# Patient Record
Sex: MALE | Race: BLACK OR AFRICAN AMERICAN | NOT HISPANIC OR LATINO | URBAN - METROPOLITAN AREA
[De-identification: names, ages, dates, MRNs, and addresses within clinical notes are randomized per-mention and may not be internally consistent; named-entity substitution may affect disease eponyms.]

---

## 2020-03-09 ENCOUNTER — EMERGENCY (EMERGENCY)
Facility: HOSPITAL | Age: 25
LOS: 1 days | End: 2020-03-09
Admitting: EMERGENCY MEDICINE
Payer: OTHER GOVERNMENT

## 2020-03-09 PROCEDURE — 99284 EMERGENCY DEPT VISIT MOD MDM: CPT | Mod: 57

## 2020-03-09 PROCEDURE — 73110 X-RAY EXAM OF WRIST: CPT | Mod: 26,RT

## 2020-03-09 PROCEDURE — 73130 X-RAY EXAM OF HAND: CPT | Mod: 26,RT

## 2020-03-09 PROCEDURE — 26600 TREAT METACARPAL FRACTURE: CPT | Mod: 54

## 2020-03-10 PROBLEM — Z00.00 ENCOUNTER FOR PREVENTIVE HEALTH EXAMINATION: Status: ACTIVE | Noted: 2020-03-10

## 2020-03-12 ENCOUNTER — APPOINTMENT (OUTPATIENT)
Dept: ORTHOPEDIC SURGERY | Facility: CLINIC | Age: 25
End: 2020-03-12
Payer: SELF-PAY

## 2020-03-12 VITALS — BODY MASS INDEX: 19.48 KG/M2 | HEIGHT: 76 IN | WEIGHT: 160 LBS

## 2020-03-12 DIAGNOSIS — F12.90 CANNABIS USE, UNSPECIFIED, UNCOMPLICATED: ICD-10-CM

## 2020-03-12 DIAGNOSIS — Z78.9 OTHER SPECIFIED HEALTH STATUS: ICD-10-CM

## 2020-03-12 DIAGNOSIS — S62.306A UNSPECIFIED FRACTURE OF FIFTH METACARPAL BONE, RIGHT HAND, INITIAL ENCOUNTER FOR CLOSED FRACTURE: ICD-10-CM

## 2020-03-12 PROCEDURE — 73130 X-RAY EXAM OF HAND: CPT | Mod: RT

## 2020-03-12 PROCEDURE — 29125 APPL SHORT ARM SPLINT STATIC: CPT | Mod: RT

## 2020-03-12 PROCEDURE — 99203 OFFICE O/P NEW LOW 30 MIN: CPT | Mod: 25

## 2020-03-12 NOTE — PROCEDURE
[FreeTextEntry1] : He was placed into a well-padded and well-molded right short arm ulnar gutter splint in the intrinsic plus position. He was instructed on cast care and activity modification. He will follow-up in 3 weeks for x-rays out of the splint.

## 2020-03-12 NOTE — DISCUSSION/SUMMARY
[FreeTextEntry1] : He has findings consistent with an acute on chronic right fifth metacarpal shaft fracture.  The overall alignment is acceptable.\par \par I had a discussion regarding today's visit, the diagnosis, and treatment recommendations / options. At this time, I recommended placement into a splint. He will follow-up in 3 weeks for x-rays out of the splint.\par \par The patient has agreed to this plan of management and has expressed full understanding.  All questions were fully answered to the patient's satisfaction.\par \par Over 50% of the time spent with the patient was on counseling the patient on the above diagnosis, treatment plan and prognosis.\par

## 2020-03-12 NOTE — PHYSICAL EXAM
[de-identified] : - Constitutional: This is a male in no obvious distress. He is a prisoner and was escorted in by two police officers.\par - Psych: Patient is alert and oriented to person, place and time.  Patient has a normal mood and affect.\par - Cardiovascular: Normal pulses throughout the upper extremities.  No significant varicosities are noted in the upper extremities. \par - Neuro: Strength and sensation are intact throughout the upper extremities.  Patient has normal coordination.\par - Respiratory:  Patient exhibits no evidence of shortness of breath or difficulty breathing.\par - Skin: No rashes, lesions, or other abnormalities are noted in the upper extremities.\par \par ---\par \par Examination of his right hand after the splint was removed demonstrates mild swelling.  He is tender along the proximal shaft of the fifth metacarpal.  There is a palpable dorsal prominence.  There is no rotational deformity.  He has appropriate flexion and extension of the digits.  He is neurovascularly intact distally. [de-identified] : I reviewed AP, lateral oblique radiographs of his right hand from 4 days ago which demonstrate a fourth metacarpal shaft fracture with callus.  There is minimal dorsal angulation apex dorsal of approximately 10 degrees.

## 2020-03-12 NOTE — HISTORY OF PRESENT ILLNESS
[Right] : right hand dominant [FreeTextEntry1] : He comes in today for evaluation of a right hand facture, which occurred 3 months ago. He reports that he has fractured his right hand after punching a wall. He states that he refractured the wrist "a couple times" with the latest fracture having occurred 4 days ago.  [place] [facility] [place] where he had x-rays taken and he was placed into a splint. He rates his pain a 9 out of 10 at this time. \par \par He is a prisoner and was escorted in with two police officers.

## 2020-03-12 NOTE — END OF VISIT
[FreeTextEntry3] : All medical record entries made by the Scribe were at my, Dr. Correa, direction and personally dictated by me on 03/12/2020. I have reviewed the chart and agree that the record accurately reflects my personal performances of the history, physical exam, assessment, and plan. I have also personally directed, reviewed, and agreed with the chart.\par

## 2021-02-19 ENCOUNTER — INPATIENT (INPATIENT)
Facility: HOSPITAL | Age: 26
LOS: 1 days | Discharge: HOME | DRG: 310 | End: 2021-02-21
Attending: INTERNAL MEDICINE | Admitting: INTERNAL MEDICINE
Payer: COMMERCIAL

## 2021-02-19 VITALS
SYSTOLIC BLOOD PRESSURE: 99 MMHG | TEMPERATURE: 98 F | HEART RATE: 98 BPM | OXYGEN SATURATION: 99 % | DIASTOLIC BLOOD PRESSURE: 61 MMHG | RESPIRATION RATE: 16 BRPM

## 2021-02-19 DIAGNOSIS — I48.91 UNSPECIFIED ATRIAL FIBRILLATION: ICD-10-CM

## 2021-02-19 LAB
ACETONE SERPL-MCNC: NEGATIVE — SIGNIFICANT CHANGE UP
ALBUMIN SERPL ELPH-MCNC: 4.2 G/DL — SIGNIFICANT CHANGE UP (ref 3.3–5.2)
ALBUMIN SERPL ELPH-MCNC: 4.4 G/DL — SIGNIFICANT CHANGE UP (ref 3.3–5.2)
ALP SERPL-CCNC: 61 U/L — SIGNIFICANT CHANGE UP (ref 40–120)
ALP SERPL-CCNC: 68 U/L — SIGNIFICANT CHANGE UP (ref 40–120)
ALT FLD-CCNC: 16 U/L — SIGNIFICANT CHANGE UP
ALT FLD-CCNC: 18 U/L — SIGNIFICANT CHANGE UP
ANION GAP SERPL CALC-SCNC: 11 MMOL/L — SIGNIFICANT CHANGE UP (ref 5–17)
ANION GAP SERPL CALC-SCNC: 24 MMOL/L — HIGH (ref 5–17)
APPEARANCE UR: CLEAR — SIGNIFICANT CHANGE UP
AST SERPL-CCNC: 23 U/L — SIGNIFICANT CHANGE UP
AST SERPL-CCNC: 25 U/L — SIGNIFICANT CHANGE UP
BASE EXCESS BLDV CALC-SCNC: -1.3 MMOL/L — SIGNIFICANT CHANGE UP (ref -2–2)
BASE EXCESS BLDV CALC-SCNC: -11.2 MMOL/L — LOW (ref -2–2)
BASOPHILS # BLD AUTO: 0.05 K/UL — SIGNIFICANT CHANGE UP (ref 0–0.2)
BASOPHILS NFR BLD AUTO: 0.2 % — SIGNIFICANT CHANGE UP (ref 0–2)
BILIRUB SERPL-MCNC: 0.2 MG/DL — LOW (ref 0.4–2)
BILIRUB SERPL-MCNC: <0.2 MG/DL — LOW (ref 0.4–2)
BILIRUB UR-MCNC: NEGATIVE — SIGNIFICANT CHANGE UP
BUN SERPL-MCNC: 5 MG/DL — LOW (ref 8–20)
BUN SERPL-MCNC: 5 MG/DL — LOW (ref 8–20)
CA-I SERPL-SCNC: 1.1 MMOL/L — LOW (ref 1.15–1.33)
CA-I SERPL-SCNC: 1.15 MMOL/L — SIGNIFICANT CHANGE UP (ref 1.15–1.33)
CALCIUM SERPL-MCNC: 8.3 MG/DL — LOW (ref 8.6–10.2)
CALCIUM SERPL-MCNC: 8.9 MG/DL — SIGNIFICANT CHANGE UP (ref 8.6–10.2)
CHLORIDE BLDV-SCNC: 105 MMOL/L — SIGNIFICANT CHANGE UP (ref 98–107)
CHLORIDE BLDV-SCNC: 108 MMOL/L — HIGH (ref 98–107)
CHLORIDE SERPL-SCNC: 101 MMOL/L — SIGNIFICANT CHANGE UP (ref 98–107)
CHLORIDE SERPL-SCNC: 105 MMOL/L — SIGNIFICANT CHANGE UP (ref 98–107)
CO2 SERPL-SCNC: 14 MMOL/L — LOW (ref 22–29)
CO2 SERPL-SCNC: 23 MMOL/L — SIGNIFICANT CHANGE UP (ref 22–29)
COLOR SPEC: YELLOW — SIGNIFICANT CHANGE UP
CREAT SERPL-MCNC: 1.1 MG/DL — SIGNIFICANT CHANGE UP (ref 0.5–1.3)
CREAT SERPL-MCNC: 1.41 MG/DL — HIGH (ref 0.5–1.3)
DIFF PNL FLD: NEGATIVE — SIGNIFICANT CHANGE UP
EOSINOPHIL # BLD AUTO: 0.02 K/UL — SIGNIFICANT CHANGE UP (ref 0–0.5)
EOSINOPHIL NFR BLD AUTO: 0.1 % — SIGNIFICANT CHANGE UP (ref 0–6)
GAS PNL BLDV: 140 MMOL/L — SIGNIFICANT CHANGE UP (ref 135–145)
GAS PNL BLDV: 141 MMOL/L — SIGNIFICANT CHANGE UP (ref 135–145)
GAS PNL BLDV: SIGNIFICANT CHANGE UP
GLUCOSE BLDV-MCNC: 182 MG/DL — HIGH (ref 70–99)
GLUCOSE BLDV-MCNC: 82 MG/DL — SIGNIFICANT CHANGE UP (ref 70–99)
GLUCOSE SERPL-MCNC: 215 MG/DL — HIGH (ref 70–99)
GLUCOSE SERPL-MCNC: 84 MG/DL — SIGNIFICANT CHANGE UP (ref 70–99)
GLUCOSE UR QL: NEGATIVE MG/DL — SIGNIFICANT CHANGE UP
HCO3 BLDV-SCNC: 15 MMOL/L — LOW (ref 20–26)
HCO3 BLDV-SCNC: 23 MMOL/L — SIGNIFICANT CHANGE UP (ref 20–26)
HCT VFR BLD CALC: 44.5 % — SIGNIFICANT CHANGE UP (ref 39–50)
HCT VFR BLDA CALC: 42 — SIGNIFICANT CHANGE UP (ref 39–50)
HCT VFR BLDA CALC: 47 — SIGNIFICANT CHANGE UP (ref 39–50)
HGB BLD CALC-MCNC: 13.7 G/DL — SIGNIFICANT CHANGE UP (ref 13–17)
HGB BLD CALC-MCNC: 15.4 G/DL — SIGNIFICANT CHANGE UP (ref 13–17)
HGB BLD-MCNC: 14.7 G/DL — SIGNIFICANT CHANGE UP (ref 13–17)
IMM GRANULOCYTES NFR BLD AUTO: 1 % — SIGNIFICANT CHANGE UP (ref 0–1.5)
KETONES UR-MCNC: NEGATIVE — SIGNIFICANT CHANGE UP
LACTATE BLDV-MCNC: 12.8 MMOL/L — CRITICAL HIGH (ref 0.5–2)
LACTATE BLDV-MCNC: 2 MMOL/L — SIGNIFICANT CHANGE UP (ref 0.5–2)
LEUKOCYTE ESTERASE UR-ACNC: NEGATIVE — SIGNIFICANT CHANGE UP
LYMPHOCYTES # BLD AUTO: 12.1 % — LOW (ref 13–44)
LYMPHOCYTES # BLD AUTO: 2.43 K/UL — SIGNIFICANT CHANGE UP (ref 1–3.3)
MCHC RBC-ENTMCNC: 30.6 PG — SIGNIFICANT CHANGE UP (ref 27–34)
MCHC RBC-ENTMCNC: 33 GM/DL — SIGNIFICANT CHANGE UP (ref 32–36)
MCV RBC AUTO: 92.5 FL — SIGNIFICANT CHANGE UP (ref 80–100)
MONOCYTES # BLD AUTO: 0.93 K/UL — HIGH (ref 0–0.9)
MONOCYTES NFR BLD AUTO: 4.6 % — SIGNIFICANT CHANGE UP (ref 2–14)
NEUTROPHILS # BLD AUTO: 16.48 K/UL — HIGH (ref 1.8–7.4)
NEUTROPHILS NFR BLD AUTO: 82 % — HIGH (ref 43–77)
NITRITE UR-MCNC: NEGATIVE — SIGNIFICANT CHANGE UP
OTHER CELLS CSF MANUAL: 15 ML/DL — LOW (ref 18–22)
OTHER CELLS CSF MANUAL: 18 ML/DL — SIGNIFICANT CHANGE UP (ref 18–22)
PCO2 BLDV: 46 MMHG — SIGNIFICANT CHANGE UP (ref 35–50)
PCO2 BLDV: 48 MMHG — SIGNIFICANT CHANGE UP (ref 35–50)
PH BLDV: 7.17 — CRITICAL LOW (ref 7.32–7.43)
PH BLDV: 7.34 — SIGNIFICANT CHANGE UP (ref 7.32–7.43)
PH UR: 6 — SIGNIFICANT CHANGE UP (ref 5–8)
PLATELET # BLD AUTO: 215 K/UL — SIGNIFICANT CHANGE UP (ref 150–400)
PO2 BLDV: 133 MMHG — HIGH (ref 25–45)
PO2 BLDV: 48 MMHG — HIGH (ref 25–45)
POTASSIUM BLDV-SCNC: 3.7 MMOL/L — SIGNIFICANT CHANGE UP (ref 3.4–4.5)
POTASSIUM BLDV-SCNC: 4 MMOL/L — SIGNIFICANT CHANGE UP (ref 3.4–4.5)
POTASSIUM SERPL-MCNC: 4 MMOL/L — SIGNIFICANT CHANGE UP (ref 3.5–5.3)
POTASSIUM SERPL-MCNC: 4 MMOL/L — SIGNIFICANT CHANGE UP (ref 3.5–5.3)
POTASSIUM SERPL-SCNC: 4 MMOL/L — SIGNIFICANT CHANGE UP (ref 3.5–5.3)
POTASSIUM SERPL-SCNC: 4 MMOL/L — SIGNIFICANT CHANGE UP (ref 3.5–5.3)
PROT SERPL-MCNC: 6.7 G/DL — SIGNIFICANT CHANGE UP (ref 6.6–8.7)
PROT SERPL-MCNC: 7.2 G/DL — SIGNIFICANT CHANGE UP (ref 6.6–8.7)
PROT UR-MCNC: NEGATIVE MG/DL — SIGNIFICANT CHANGE UP
RBC # BLD: 4.81 M/UL — SIGNIFICANT CHANGE UP (ref 4.2–5.8)
RBC # FLD: 12.1 % — SIGNIFICANT CHANGE UP (ref 10.3–14.5)
SAO2 % BLDV: 73 % — SIGNIFICANT CHANGE UP
SAO2 % BLDV: 99 % — SIGNIFICANT CHANGE UP
SARS-COV-2 RNA SPEC QL NAA+PROBE: SIGNIFICANT CHANGE UP
SODIUM SERPL-SCNC: 139 MMOL/L — SIGNIFICANT CHANGE UP (ref 135–145)
SODIUM SERPL-SCNC: 139 MMOL/L — SIGNIFICANT CHANGE UP (ref 135–145)
SP GR SPEC: 1 — LOW (ref 1.01–1.02)
TROPONIN T SERPL-MCNC: <0.01 NG/ML — SIGNIFICANT CHANGE UP (ref 0–0.06)
UROBILINOGEN FLD QL: NEGATIVE MG/DL — SIGNIFICANT CHANGE UP
WBC # BLD: 20.12 K/UL — HIGH (ref 3.8–10.5)
WBC # FLD AUTO: 20.12 K/UL — HIGH (ref 3.8–10.5)

## 2021-02-19 PROCEDURE — 93010 ELECTROCARDIOGRAM REPORT: CPT

## 2021-02-19 PROCEDURE — 72125 CT NECK SPINE W/O DYE: CPT | Mod: 26

## 2021-02-19 PROCEDURE — 71045 X-RAY EXAM CHEST 1 VIEW: CPT | Mod: 26

## 2021-02-19 PROCEDURE — 99223 1ST HOSP IP/OBS HIGH 75: CPT

## 2021-02-19 PROCEDURE — 99285 EMERGENCY DEPT VISIT HI MDM: CPT

## 2021-02-19 PROCEDURE — 73130 X-RAY EXAM OF HAND: CPT | Mod: 26,LT

## 2021-02-19 PROCEDURE — 70450 CT HEAD/BRAIN W/O DYE: CPT | Mod: 26

## 2021-02-19 RX ORDER — DILTIAZEM HCL 120 MG
30 CAPSULE, EXT RELEASE 24 HR ORAL EVERY 6 HOURS
Refills: 0 | Status: DISCONTINUED | OUTPATIENT
Start: 2021-02-19 | End: 2021-02-21

## 2021-02-19 RX ORDER — GLUCAGON INJECTION, SOLUTION 0.5 MG/.1ML
1 INJECTION, SOLUTION SUBCUTANEOUS ONCE
Refills: 0 | Status: DISCONTINUED | OUTPATIENT
Start: 2021-02-19 | End: 2021-02-21

## 2021-02-19 RX ORDER — DEXTROSE 50 % IN WATER 50 %
15 SYRINGE (ML) INTRAVENOUS ONCE
Refills: 0 | Status: DISCONTINUED | OUTPATIENT
Start: 2021-02-19 | End: 2021-02-21

## 2021-02-19 RX ORDER — TETANUS TOXOID, REDUCED DIPHTHERIA TOXOID AND ACELLULAR PERTUSSIS VACCINE, ADSORBED 5; 2.5; 8; 8; 2.5 [IU]/.5ML; [IU]/.5ML; UG/.5ML; UG/.5ML; UG/.5ML
0.5 SUSPENSION INTRAMUSCULAR ONCE
Refills: 0 | Status: COMPLETED | OUTPATIENT
Start: 2021-02-19 | End: 2021-02-19

## 2021-02-19 RX ORDER — SODIUM CHLORIDE 9 MG/ML
1000 INJECTION, SOLUTION INTRAVENOUS
Refills: 0 | Status: DISCONTINUED | OUTPATIENT
Start: 2021-02-19 | End: 2021-02-21

## 2021-02-19 RX ORDER — SODIUM CHLORIDE 9 MG/ML
1000 INJECTION INTRAMUSCULAR; INTRAVENOUS; SUBCUTANEOUS
Refills: 0 | Status: DISCONTINUED | OUTPATIENT
Start: 2021-02-19 | End: 2021-02-21

## 2021-02-19 RX ORDER — DILTIAZEM HCL 120 MG
5 CAPSULE, EXT RELEASE 24 HR ORAL EVERY 6 HOURS
Refills: 0 | Status: DISCONTINUED | OUTPATIENT
Start: 2021-02-19 | End: 2021-02-21

## 2021-02-19 RX ORDER — ASPIRIN/CALCIUM CARB/MAGNESIUM 324 MG
81 TABLET ORAL DAILY
Refills: 0 | Status: DISCONTINUED | OUTPATIENT
Start: 2021-02-19 | End: 2021-02-21

## 2021-02-19 RX ORDER — ASPIRIN/CALCIUM CARB/MAGNESIUM 324 MG
325 TABLET ORAL ONCE
Refills: 0 | Status: COMPLETED | OUTPATIENT
Start: 2021-02-19 | End: 2021-02-19

## 2021-02-19 RX ORDER — SODIUM CHLORIDE 9 MG/ML
1000 INJECTION INTRAMUSCULAR; INTRAVENOUS; SUBCUTANEOUS ONCE
Refills: 0 | Status: COMPLETED | OUTPATIENT
Start: 2021-02-19 | End: 2021-02-19

## 2021-02-19 RX ORDER — INSULIN GLARGINE 100 [IU]/ML
10 INJECTION, SOLUTION SUBCUTANEOUS AT BEDTIME
Refills: 0 | Status: DISCONTINUED | OUTPATIENT
Start: 2021-02-19 | End: 2021-02-21

## 2021-02-19 RX ORDER — SODIUM CHLORIDE 9 MG/ML
1000 INJECTION, SOLUTION INTRAVENOUS ONCE
Refills: 0 | Status: COMPLETED | OUTPATIENT
Start: 2021-02-19 | End: 2021-02-19

## 2021-02-19 RX ORDER — INSULIN LISPRO 100/ML
VIAL (ML) SUBCUTANEOUS AT BEDTIME
Refills: 0 | Status: DISCONTINUED | OUTPATIENT
Start: 2021-02-19 | End: 2021-02-21

## 2021-02-19 RX ORDER — DEXTROSE 50 % IN WATER 50 %
12.5 SYRINGE (ML) INTRAVENOUS ONCE
Refills: 0 | Status: DISCONTINUED | OUTPATIENT
Start: 2021-02-19 | End: 2021-02-21

## 2021-02-19 RX ORDER — INSULIN LISPRO 100/ML
VIAL (ML) SUBCUTANEOUS
Refills: 0 | Status: DISCONTINUED | OUTPATIENT
Start: 2021-02-19 | End: 2021-02-21

## 2021-02-19 RX ORDER — DEXTROSE 50 % IN WATER 50 %
25 SYRINGE (ML) INTRAVENOUS ONCE
Refills: 0 | Status: DISCONTINUED | OUTPATIENT
Start: 2021-02-19 | End: 2021-02-21

## 2021-02-19 RX ADMIN — SODIUM CHLORIDE 1000 MILLILITER(S): 9 INJECTION, SOLUTION INTRAVENOUS at 20:30

## 2021-02-19 RX ADMIN — Medication 30 MILLIGRAM(S): at 22:48

## 2021-02-19 RX ADMIN — Medication 325 MILLIGRAM(S): at 22:48

## 2021-02-19 RX ADMIN — SODIUM CHLORIDE 1000 MILLILITER(S): 9 INJECTION INTRAMUSCULAR; INTRAVENOUS; SUBCUTANEOUS at 17:30

## 2021-02-19 RX ADMIN — SODIUM CHLORIDE 1000 MILLILITER(S): 9 INJECTION, SOLUTION INTRAVENOUS at 19:31

## 2021-02-19 RX ADMIN — SODIUM CHLORIDE 1000 MILLILITER(S): 9 INJECTION INTRAMUSCULAR; INTRAVENOUS; SUBCUTANEOUS at 19:31

## 2021-02-19 NOTE — ED PROVIDER NOTE - ATTENDING CONTRIBUTION TO CARE
27 yom pmh DM, asthma biba after being tased. Hit the ground and hit his head and left hand. Vomit once prior to arrival. Was feeling well this morning. Under custody with PD. hyperglycemic here.   AP - ekg with new onset afib. will check labs for DKA. CT imaging r/o traumatic injury

## 2021-02-19 NOTE — H&P ADULT - HISTORY OF PRESENT ILLNESS
pt. is a 26 y/o Male with h/o IDDM presents c/o nausea. Pt was in an altercation with police and attempted to flee. He was tazered and fell.  Pt had an episode of vomiting and was brought to the ED for evaluation. pt. reports no abd. pain, no vomiting in the ER. no cp. no cough, no dizziness, no fever. pt. was noted to be in rapid afib in the ER. pt. has no prior h/o afib but stated that he gets palpitations on and off for past 1 year and feels that his heart is racing. pt. reports getting some sob when gets palpitations.  pt. is resting quietly at the time of admission.  pt. is a 24 y/o Male with h/o IDDM presents c/o nausea. Pt was in an altercation with police and attempted to flee. He was tazered and fell.  Pt had an episode of vomiting and was brought in by police to the ED for evaluation. pt. reports no abd. pain, no vomiting in the ER. no cp. no cough, no dizziness, no fever. pt. was noted to be in rapid afib in the ER. pt. has no prior h/o afib but stated that he gets palpitations on and off for past 1 year and feels that his heart is racing. pt. reports getting some sob when gets palpitations.  pt. is resting quietly at the time of admission. pt. does not know which insulin he uses.

## 2021-02-19 NOTE — ED PROVIDER NOTE - CLINICAL SUMMARY MEDICAL DECISION MAKING FREE TEXT BOX
Pt is a 26 y/o M w/ new onset a-fib, will admit to tele for observation, chadsvasc 1, will start asa daily and cardizem

## 2021-02-19 NOTE — ED PROVIDER NOTE - OBJECTIVE STATEMENT
Pt is a 26 y/o M w/PMHx IDDM presents c/o nausea.  Pt was in an altercation with police and attempted to flee.  He was tazered and fell.  Pt had an episode of vomiting and was brought to the ED for evaluation.  Pt does not know how much insulin he takes, states his mother usually checks for him.  He last took insulin yesterday.

## 2021-02-19 NOTE — H&P ADULT - NSHPPHYSICALEXAM_GEN_ALL_CORE
General: Pt. lying in bed not in distress.  HEENT: AT, NC. PERRL. intact EOM. no throat erythema or exudate.   Neck: supple. no JVD.   Chest: CTA bilaterally  Heart: S1,S2. IRRR. no heart murmur. no edema.  Abdomen: soft. non-tender. non-distended. + BS.   Ext: no calf tenderness. FROM of all ext. distal pulses 2 +.  Neuro: AAO x3. no focal weakness. no speech disorder, cns ii to xii intact. m /r/s intact.   Skin: no rash noted, no diaphoresis. warm and dry.   psych : no agitation, co-operative, no si/hi. General: Pt. lying in bed not in distress.  HEENT: AT, NC. PERRL. intact EOM. no throat erythema or exudate.   Neck: supple. no JVD.   Chest: CTA bilaterally  Heart: S1,S2. IRRR. no heart murmur. no edema.  Abdomen: soft. non-tender. non-distended. + BS.   Ext: no calf tenderness. FROM of all ext. distal pulses 2 +.  Neuro: AAO x3. no focal weakness. no speech disorder, cns ii to xii intact. m /r/s intact.   Skin: mild scratches over dorsal aspect of wrist b/L noted. no open wound. no diaphoresis. warm and dry.   psych : no agitation, co-operative, no si/hi.

## 2021-02-19 NOTE — ED PROVIDER NOTE - NS ED ROS FT
CONSTITUTIONAL: No fevers, no chills  Eyes: No vision changes  Cardiovascular: No Chest pain  Respiratory: No SOB  Gastrointestinal: +n/v, no c/d, no abd pain  Genitourinary: no dysuria, no hematuria  SKIN: no rashes.  MSK: no weakness, no myalgias, no arthralgias  NEURO: no headache, no weakness, no numbness  PSYCHIATRIC: no SI/HI

## 2021-02-19 NOTE — H&P ADULT - ASSESSMENT
pt. is a 24 y/o Male with h/o IDDM presents c/o nausea. Pt was in an altercation with police and attempted to flee. He was tazered and fell.  Pt had an episode of vomiting and was brought in by police to the ED for evaluation. pt. reports no abd. pain, no vomiting in the ER. no cp. no cough, no dizziness, no fever. pt. was noted to be in rapid afib in the ER. pt. has no prior h/o afib but stated that he gets palpitations on and off for past 1 year and feels that his heart is racing. pt. reports getting some sob when gets palpitations.  pt. is resting quietly at the time of admission. pt. does not know which insulin he uses. pt. will be admitted for new onset afib.    - New onset afib, pt. reporting on and off palpitations for past 1 year , may be afib and was never diagnosed, will give one full dose of lovenox , rate control with po cardizem. will get echo, serial trop.  may need MASSIMO,  cardio consult [facility] called by ER physician dr. ornelas.     - DM type 1 with long term insulin use with hyperglycemia. will keep on lantus and humalog scale.  pt. is a 24 y/o Male with h/o IDDM presents c/o nausea. Pt was in an altercation with police and attempted to flee. He was tazered and fell.  Pt had an episode of vomiting and was brought in by police to the ED for evaluation. pt. reports no abd. pain, no vomiting in the ER. no cp. no cough, no dizziness, no fever. pt. was noted to be in rapid afib in the ER. pt. has no prior h/o afib but stated that he gets palpitations on and off for past 1 year and feels that his heart is racing. pt. reports getting some sob when gets palpitations.  pt. is resting quietly at the time of admission. pt. does not know which insulin he uses. pt. will be admitted for new onset afib.    - New onset afib, pt. reporting on and off palpitations for past 1 year , may be afib and was never diagnosed, will give one full dose of lovenox , rate control with po cardizem. will get echo, serial trop.  may need MASSIMO,  cardio consult [place] [facility] called by ER physician dr. ornelas.     - DM type 1 with long term insulin use with hyperglycemia. will keep on lantus and humalog scale.     - Leukocytosis unspecified type. pt. has no fever, non toxic looking, cxr no acute findings, possible stress induced, follow repeat cbc in am.

## 2021-02-19 NOTE — ED ADULT TRIAGE NOTE - CHIEF COMPLAINT QUOTE
as per EMS report, pt was in a physical altercation with the police, ran from the police, was tased multiple times (no longer in patient), then began vomiting and becoming less responsive.  Upon arriving to ED, patient awake, alert, cooperative.  In policy custody badge #[id number]  .  Dried blood noted to left hand.  Dr. Christianson called for eval. as per EMS report, pt was in a physical altercation with the police, ran from the police, was tased multiple times (no longer in patient), then hit his head when falling, then began vomiting and becoming less responsive.  Upon arriving to ED, patient awake, alert, cooperative.  In policy custody badge #[id number]  .  Dried blood noted to left hand.  Dr. Christianson called for eval.

## 2021-02-19 NOTE — ED ADULT NURSE NOTE - CHIEF COMPLAINT QUOTE
as per EMS report, pt was in a physical altercation with the police, ran from the police, was tased multiple times (no longer in patient), then hit his head when falling, then began vomiting and becoming less responsive.  Upon arriving to ED, patient awake, alert, cooperative.  In policy custody badge #[id number]  .  Dried blood noted to left hand.  Dr. Christianson called for eval.

## 2021-02-20 DIAGNOSIS — I48.91 UNSPECIFIED ATRIAL FIBRILLATION: ICD-10-CM

## 2021-02-20 LAB
A1C WITH ESTIMATED AVERAGE GLUCOSE RESULT: 5.1 % — SIGNIFICANT CHANGE UP (ref 4–5.6)
AMPHET UR-MCNC: NEGATIVE — SIGNIFICANT CHANGE UP
B-OH-BUTYR SERPL-SCNC: 0.4 MMOL/L — SIGNIFICANT CHANGE UP
BARBITURATES UR SCN-MCNC: NEGATIVE — SIGNIFICANT CHANGE UP
BASOPHILS # BLD AUTO: 0.01 K/UL — SIGNIFICANT CHANGE UP (ref 0–0.2)
BASOPHILS NFR BLD AUTO: 0.1 % — SIGNIFICANT CHANGE UP (ref 0–2)
BENZODIAZ UR-MCNC: NEGATIVE — SIGNIFICANT CHANGE UP
COCAINE METAB.OTHER UR-MCNC: POSITIVE
EOSINOPHIL # BLD AUTO: 0 K/UL — SIGNIFICANT CHANGE UP (ref 0–0.5)
EOSINOPHIL NFR BLD AUTO: 0 % — SIGNIFICANT CHANGE UP (ref 0–6)
ESTIMATED AVERAGE GLUCOSE: 100 MG/DL — SIGNIFICANT CHANGE UP (ref 68–114)
GLUCOSE BLDC GLUCOMTR-MCNC: 74 MG/DL — SIGNIFICANT CHANGE UP (ref 70–99)
GLUCOSE BLDC GLUCOMTR-MCNC: 80 MG/DL — SIGNIFICANT CHANGE UP (ref 70–99)
GLUCOSE BLDC GLUCOMTR-MCNC: 82 MG/DL — SIGNIFICANT CHANGE UP (ref 70–99)
GLUCOSE BLDC GLUCOMTR-MCNC: 83 MG/DL — SIGNIFICANT CHANGE UP (ref 70–99)
GLUCOSE BLDC GLUCOMTR-MCNC: 87 MG/DL — SIGNIFICANT CHANGE UP (ref 70–99)
HCT VFR BLD CALC: 36.5 % — LOW (ref 39–50)
HGB BLD-MCNC: 12.8 G/DL — LOW (ref 13–17)
IMM GRANULOCYTES NFR BLD AUTO: 0.2 % — SIGNIFICANT CHANGE UP (ref 0–1.5)
LYMPHOCYTES # BLD AUTO: 1.19 K/UL — SIGNIFICANT CHANGE UP (ref 1–3.3)
LYMPHOCYTES # BLD AUTO: 11.5 % — LOW (ref 13–44)
MCHC RBC-ENTMCNC: 31.1 PG — SIGNIFICANT CHANGE UP (ref 27–34)
MCHC RBC-ENTMCNC: 35.1 GM/DL — SIGNIFICANT CHANGE UP (ref 32–36)
MCV RBC AUTO: 88.6 FL — SIGNIFICANT CHANGE UP (ref 80–100)
METHADONE UR-MCNC: NEGATIVE — SIGNIFICANT CHANGE UP
MONOCYTES # BLD AUTO: 0.75 K/UL — SIGNIFICANT CHANGE UP (ref 0–0.9)
MONOCYTES NFR BLD AUTO: 7.3 % — SIGNIFICANT CHANGE UP (ref 2–14)
NEUTROPHILS # BLD AUTO: 8.35 K/UL — HIGH (ref 1.8–7.4)
NEUTROPHILS NFR BLD AUTO: 80.9 % — HIGH (ref 43–77)
OPIATES UR-MCNC: NEGATIVE — SIGNIFICANT CHANGE UP
PCP SPEC-MCNC: SIGNIFICANT CHANGE UP
PCP UR-MCNC: NEGATIVE — SIGNIFICANT CHANGE UP
PLATELET # BLD AUTO: 161 K/UL — SIGNIFICANT CHANGE UP (ref 150–400)
RBC # BLD: 4.12 M/UL — LOW (ref 4.2–5.8)
RBC # FLD: 12.2 % — SIGNIFICANT CHANGE UP (ref 10.3–14.5)
SARS-COV-2 IGG SERPL QL IA: NEGATIVE — SIGNIFICANT CHANGE UP
SARS-COV-2 IGM SERPL IA-ACNC: 0.07 INDEX — SIGNIFICANT CHANGE UP
THC UR QL: POSITIVE
TROPONIN T SERPL-MCNC: <0.01 NG/ML — SIGNIFICANT CHANGE UP (ref 0–0.06)
TROPONIN T SERPL-MCNC: <0.01 NG/ML — SIGNIFICANT CHANGE UP (ref 0–0.06)
WBC # BLD: 10.32 K/UL — SIGNIFICANT CHANGE UP (ref 3.8–10.5)
WBC # FLD AUTO: 10.32 K/UL — SIGNIFICANT CHANGE UP (ref 3.8–10.5)

## 2021-02-20 PROCEDURE — 99232 SBSQ HOSP IP/OBS MODERATE 35: CPT

## 2021-02-20 PROCEDURE — 99233 SBSQ HOSP IP/OBS HIGH 50: CPT

## 2021-02-20 PROCEDURE — 93306 TTE W/DOPPLER COMPLETE: CPT | Mod: 26

## 2021-02-20 RX ORDER — ENOXAPARIN SODIUM 100 MG/ML
70 INJECTION SUBCUTANEOUS ONCE
Refills: 0 | Status: COMPLETED | OUTPATIENT
Start: 2021-02-20 | End: 2021-02-20

## 2021-02-20 RX ADMIN — Medication 81 MILLIGRAM(S): at 08:06

## 2021-02-20 RX ADMIN — Medication 30 MILLIGRAM(S): at 23:31

## 2021-02-20 RX ADMIN — ENOXAPARIN SODIUM 70 MILLIGRAM(S): 100 INJECTION SUBCUTANEOUS at 01:00

## 2021-02-20 RX ADMIN — Medication 30 MILLIGRAM(S): at 12:07

## 2021-02-20 RX ADMIN — Medication 30 MILLIGRAM(S): at 18:56

## 2021-02-20 RX ADMIN — Medication 30 MILLIGRAM(S): at 05:47

## 2021-02-20 RX ADMIN — INSULIN GLARGINE 10 UNIT(S): 100 INJECTION, SOLUTION SUBCUTANEOUS at 21:50

## 2021-02-20 RX ADMIN — SODIUM CHLORIDE 125 MILLILITER(S): 9 INJECTION INTRAMUSCULAR; INTRAVENOUS; SUBCUTANEOUS at 00:56

## 2021-02-20 NOTE — CONSULT NOTE ADULT - SUBJECTIVE AND OBJECTIVE BOX
[facility]                                                        Office: [address] [address]                                                       Telephone: [phone]. Fax:[phone]                                                              CARDIOLOGY CONSULTATION NOTE                                                                                                 History obtained by: Patient and medical record     obtained: No    Chief complaint: new onset atrial fibrillation    HPI: Patient is a 24yo Male with h/o IDDM presents c/o nausea. Pt was in an altercation with police and attempted to flee. He was tazered and fell.  Pt had an episode of vomiting and was brought in by police to the ED for evaluation. pt. reports no abd. pain, no vomiting in the ER. no cp. no cough, no dizziness, no fever. pt. was noted to be in rapid afib in the ER. pt. has no prior h/o afib but stated that he gets palpitations on and off for past 1 year and feels that his heart is racing. pt. reports getting some sob when gets palpitations.  pt. is resting quietly at the time of admission. pt. does not know which insulin he uses.  (2021 23:15)    REVIEW OF SYMPTOMS:   Cardiovascular:  See HPI. No chest pain, No dyspnea, No syncope, + palpitations, No dizziness, No Orthopnea, No Paroxsymal nocturnal dyspnea;    Respiratory: No Dyspnea, No cough,     Genitourinary: No dysuria, no hematuria;   Gastrointestinal: No nausea, no vomiting. No diarrhea, No abdominal pain. No dark color stool, no melena;   Neurological: No headache, no dizziness, no slurred speech;    Psychiatric: No agitation, no anxiety.  ALL OTHER REVIEW OF SYSTEMS ARE NEGATIVE.    ALLERGIES: NKDA    CURRENT MEDICATIONS:  diltiazem    Tablet 30 milliGRAM(s) Oral every 6 hours  diltiazem Injectable 5 milliGRAM(s) IV Push every 6 hours PRN  aspirin  chewable  dextrose 40% Gel  glucagon  Injectable  insulin glargine Injectable (LANTUS)  insulin lispro (ADMELOG) corrective regimen sliding scale    HOME MEDICATIONS:    PAST MEDICAL HISTORY  [id number]    PAST SURGICAL HISTORY  No significant past surgical history    FAMILY HISTORY: FH: heart disease mother    SOCIAL HISTORY:  Denies smoking/alcohol/drugs    Vital Signs Last 24 Hrs  T(C): 36.8 (2021 23:22), Max: 36.8 (2021 16:53)  T(F): 98.3 (2021 23:22), Max: 98.3 (2021 23:22)  HR: 104 (2021 23:22) (93 - 105)  BP: 120/70 (2021 23:22) (99/61 - 121/65)  BP(mean): 87 (2021 23:22) (87 - 87)  RR: 16 (2021 23:22) (16 - 17)  SpO2: 97% (2021 23:22) (97% - 100%)    PHYSICAL EXAM:  Constitutional: Comfortable, no acute distress   HEENT: Atraumatic, neck is supple, no JVD   CNS: A&Ox3. No focal deficits. EOMI. Cranial nerves II-IX are intact.   Respiratory: CTAB  Cardiovascular: S1S2, Irreg/irreg, No murmur/rubs  Gastrointestinal: Soft non-tender, +Bowel sounds  Extremities: No edema.   Psychiatric: Calm no agitation  Skin: No skin rash/ulcers visualized to face, hands or feet    Intake and output:     LABS:                        14.7   20.12 )-----------( 215      ( 2021 17:24 )             44.5     02-19    139  |  105  |  5.0<L>  ----------------------------<  84  4.0   |  23.0  |  1.10    Ca    8.3<L>      2021 19:25    TPro  6.7  /  Alb  4.2  /  TBili  0.2<L>  /  DBili  x   /  AST  25  /  ALT  16  /  AlkPhos  61  02-19    CARDIAC MARKERS ( 2021 17:24 )  x     / <0.01 ng/mL / x     / x     / x        Urinalysis Basic - ( 2021 23:26 )    Color: Yellow / Appearance: Clear / S.005 / pH: x  Gluc: x / Ketone: Negative  / Bili: Negative / Urobili: Negative mg/dL   Blood: x / Protein: Negative mg/dL / Nitrite: Negative   Leuk Esterase: Negative / RBC: x / WBC x   Sq Epi: x / Non Sq Epi: x / Bacteria: x    INTERPRETATION OF TELEMETRY: A fib @ 82s Vent response, occ PVCs  ECG: A Fib w/ RVR    RADIOLOGY & ADDITIONAL STUDIES:     CT Head: No acute intracranial hemorrhage or calvarial fracture.    CT cervical spine: No acute cervical spine fracture or evidence of traumatic malalignment.                                                                       [facility]                                                        Office: [address] [address]                                                       Telephone: [phone]. Fax:[phone]                                                              CARDIOLOGY CONSULTATION NOTE                                                                                                 History obtained by: Patient and medical record     obtained: No    Chief complaint: new onset atrial fibrillation    HPI: Patient is a 26yo Male with h/o IDDM admitted after experiencing nausea earlier after an altercation with police.  During the malae, patient was tazered, fell to the floor and had an episode of vomiting.  Patient brought to [facility] ED for evaluation and during exam, noted to be in A-fib w/ RVR.  No prior h/o A-fib as per history, but patient reports palpitations on and off for past year or so.  Admits to dyspnea when palpitations present and subside at rest.  Has diabetes and asthma as medical issues.  Not seen for prior A-fib by medical team. Denies, fever, chills, sick contacts, CP, HA, slurred speech, abdominal pain or leg weakness.       REVIEW OF SYMPTOMS:   Cardiovascular:  See HPI. No chest pain, + dyspnea, No syncope, + palpitations, No dizziness, No Orthopnea, No Paroxsymal nocturnal dyspnea;    Respiratory: + Dyspnea, No cough,     Genitourinary: No dysuria, no hematuria;   Gastrointestinal: No nausea, no vomiting. No diarrhea, No abdominal pain. No dark color stool, no melena;   Neurological: No headache, no dizziness, no slurred speech;    Psychiatric: No agitation, no anxiety.  ALL OTHER REVIEW OF SYSTEMS ARE NEGATIVE.    ALLERGIES: NKDA    CURRENT MEDICATIONS:  diltiazem    Tablet 30 milliGRAM(s) Oral every 6 hours  diltiazem Injectable 5 milliGRAM(s) IV Push every 6 hours PRN  aspirin  chewable  dextrose 40% Gel  glucagon  Injectable  insulin glargine Injectable (LANTUS)  insulin lispro (ADMELOG) corrective regimen sliding scale    HOME MEDICATIONS:    PAST MEDICAL HISTORY  [id number]  Asthma    PAST SURGICAL HISTORY  No significant past surgical history    FAMILY HISTORY: FH: heart disease mother    SOCIAL HISTORY:  Denies smoking/alcohol  + Marijuana use     Vital Signs Last 24 Hrs  T(C): 36.8 (2021 23:22), Max: 36.8 (2021 16:53)  T(F): 98.3 (2021 23:22), Max: 98.3 (2021 23:22)  HR: 104 (2021 23:22) (93 - 105)  BP: 120/70 (2021 23:22) (99/61 - 121/65)  BP(mean): 87 (2021 23:22) (87 - 87)  RR: 16 (2021 23:22) (16 - 17)  SpO2: 97% (2021 23:22) (97% - 100%)    PHYSICAL EXAM:  Constitutional: Comfortable, no acute distress   HEENT: Atraumatic, neck is supple, no JVD   CNS: A&Ox3. No focal deficits. EOMI. Cranial nerves II-IX are intact.   Respiratory: CTAB  Cardiovascular: S1S2, Irreg/irreg, No murmur/rubs  Gastrointestinal: Soft non-tender, +Bowel sounds  Extremities: No edema.   Psychiatric: Calm no agitation  Skin: No skin rash/ulcers visualized to face, hands or feet    Intake and output:     LABS:                        14.7   20.12 )-----------( 215      ( 2021 17:24 )             44.5     02-19    139  |  105  |  5.0<L>  ----------------------------<  84  4.0   |  23.0  |  1.10    Ca    8.3<L>      2021 19:25    TPro  6.7  /  Alb  4.2  /  TBili  0.2<L>  /  DBili  x   /  AST  25  /  ALT  16  /  AlkPhos  61  02-19    CARDIAC MARKERS ( 2021 17:24 )  x     / <0.01 ng/mL / x     / x     / x        Urinalysis Basic - ( 2021 23:26 )    Color: Yellow / Appearance: Clear / S.005 / pH: x  Gluc: x / Ketone: Negative  / Bili: Negative / Urobili: Negative mg/dL   Blood: x / Protein: Negative mg/dL / Nitrite: Negative   Leuk Esterase: Negative / RBC: x / WBC x   Sq Epi: x / Non Sq Epi: x / Bacteria: x    INTERPRETATION OF TELEMETRY: A fib @ 82s Vent response, occ PVCs  ECG: A Fib w/ RVR    RADIOLOGY & ADDITIONAL STUDIES:     CT Head: No acute intracranial hemorrhage or calvarial fracture.    CT cervical spine: No acute cervical spine fracture or evidence of traumatic malalignment.

## 2021-02-20 NOTE — CONSULT NOTE ADULT - ATTENDING COMMENTS
26 y/o M with h/o DM admitted with new onset AF. Currently rate controlled with cardizem. TTE pending. Plan for MASSIMO/DCCV on Monday.     Plan discussed with Dr. Clifton    Thank you for allowing me to participate in the care of this patient.  Will continue to follow.

## 2021-02-20 NOTE — CONSULT NOTE ADULT - PROBLEM SELECTOR RECOMMENDATION 9
Patient currently asymptomatic and hemodynamically stable   [id number] risk score received ASA and Lovenox in ER  Admit to Tele, check CBC, BMP, trend Trops, ECG and CXR, check TTE in AM  Placed on Cardizem 30mg oral Q6 hours and Cardizem 5mg IVP as needed for optimal rate control  - if no spontaneous conversion to SR may require MASSIMO/DCCV on Monday  - patient explained and agrees w/ plan  case d/w / Lopez Patient currently asymptomatic and hemodynamically stable   [id number] risk score received ASA and Lovenox in ER  Admit to Tele, check CBC, BMP, trend Trops, ECG and CXR, check TTE in AM  Placed on Cardizem 30mg oral Q6 hours and Cardizem 5mg IVP as needed for optimal rate control  - if no spontaneous conversion to SR may require MASSIMO/DCCV on Monday  - patient explained and agrees w/ plan  case d/w Dr. Marroquin

## 2021-02-21 ENCOUNTER — TRANSCRIPTION ENCOUNTER (OUTPATIENT)
Age: 26
End: 2021-02-21

## 2021-02-21 VITALS
OXYGEN SATURATION: 96 % | RESPIRATION RATE: 18 BRPM | HEART RATE: 68 BPM | DIASTOLIC BLOOD PRESSURE: 67 MMHG | TEMPERATURE: 98 F | SYSTOLIC BLOOD PRESSURE: 101 MMHG

## 2021-02-21 LAB
ANION GAP SERPL CALC-SCNC: 13 MMOL/L — SIGNIFICANT CHANGE UP (ref 5–17)
BUN SERPL-MCNC: 6 MG/DL — LOW (ref 8–20)
CALCIUM SERPL-MCNC: 9 MG/DL — SIGNIFICANT CHANGE UP (ref 8.6–10.2)
CHLORIDE SERPL-SCNC: 102 MMOL/L — SIGNIFICANT CHANGE UP (ref 98–107)
CO2 SERPL-SCNC: 24 MMOL/L — SIGNIFICANT CHANGE UP (ref 22–29)
CREAT SERPL-MCNC: 0.85 MG/DL — SIGNIFICANT CHANGE UP (ref 0.5–1.3)
GLUCOSE BLDC GLUCOMTR-MCNC: 78 MG/DL — SIGNIFICANT CHANGE UP (ref 70–99)
GLUCOSE BLDC GLUCOMTR-MCNC: 89 MG/DL — SIGNIFICANT CHANGE UP (ref 70–99)
GLUCOSE SERPL-MCNC: 65 MG/DL — LOW (ref 70–99)
HCT VFR BLD CALC: 41.9 % — SIGNIFICANT CHANGE UP (ref 39–50)
HGB BLD-MCNC: 14.5 G/DL — SIGNIFICANT CHANGE UP (ref 13–17)
MCHC RBC-ENTMCNC: 30.7 PG — SIGNIFICANT CHANGE UP (ref 27–34)
MCHC RBC-ENTMCNC: 34.6 GM/DL — SIGNIFICANT CHANGE UP (ref 32–36)
MCV RBC AUTO: 88.6 FL — SIGNIFICANT CHANGE UP (ref 80–100)
PLATELET # BLD AUTO: 172 K/UL — SIGNIFICANT CHANGE UP (ref 150–400)
POTASSIUM SERPL-MCNC: 3.9 MMOL/L — SIGNIFICANT CHANGE UP (ref 3.5–5.3)
POTASSIUM SERPL-SCNC: 3.9 MMOL/L — SIGNIFICANT CHANGE UP (ref 3.5–5.3)
RBC # BLD: 4.73 M/UL — SIGNIFICANT CHANGE UP (ref 4.2–5.8)
RBC # FLD: 12 % — SIGNIFICANT CHANGE UP (ref 10.3–14.5)
SODIUM SERPL-SCNC: 139 MMOL/L — SIGNIFICANT CHANGE UP (ref 135–145)
WBC # BLD: 6.5 K/UL — SIGNIFICANT CHANGE UP (ref 3.8–10.5)
WBC # FLD AUTO: 6.5 K/UL — SIGNIFICANT CHANGE UP (ref 3.8–10.5)

## 2021-02-21 PROCEDURE — 82330 ASSAY OF CALCIUM: CPT

## 2021-02-21 PROCEDURE — 86769 SARS-COV-2 COVID-19 ANTIBODY: CPT

## 2021-02-21 PROCEDURE — 80053 COMPREHEN METABOLIC PANEL: CPT

## 2021-02-21 PROCEDURE — 82010 KETONE BODYS QUAN: CPT

## 2021-02-21 PROCEDURE — U0003: CPT

## 2021-02-21 PROCEDURE — 83605 ASSAY OF LACTIC ACID: CPT

## 2021-02-21 PROCEDURE — 84443 ASSAY THYROID STIM HORMONE: CPT

## 2021-02-21 PROCEDURE — 93010 ELECTROCARDIOGRAM REPORT: CPT

## 2021-02-21 PROCEDURE — 99239 HOSP IP/OBS DSCHRG MGMT >30: CPT

## 2021-02-21 PROCEDURE — 71045 X-RAY EXAM CHEST 1 VIEW: CPT

## 2021-02-21 PROCEDURE — 70450 CT HEAD/BRAIN W/O DYE: CPT

## 2021-02-21 PROCEDURE — 72125 CT NECK SPINE W/O DYE: CPT

## 2021-02-21 PROCEDURE — 84295 ASSAY OF SERUM SODIUM: CPT

## 2021-02-21 PROCEDURE — 82962 GLUCOSE BLOOD TEST: CPT

## 2021-02-21 PROCEDURE — 84484 ASSAY OF TROPONIN QUANT: CPT

## 2021-02-21 PROCEDURE — 96360 HYDRATION IV INFUSION INIT: CPT

## 2021-02-21 PROCEDURE — 99232 SBSQ HOSP IP/OBS MODERATE 35: CPT

## 2021-02-21 PROCEDURE — 73130 X-RAY EXAM OF HAND: CPT

## 2021-02-21 PROCEDURE — 85027 COMPLETE CBC AUTOMATED: CPT

## 2021-02-21 PROCEDURE — 96361 HYDRATE IV INFUSION ADD-ON: CPT

## 2021-02-21 PROCEDURE — 82803 BLOOD GASES ANY COMBINATION: CPT

## 2021-02-21 PROCEDURE — 80048 BASIC METABOLIC PNL TOTAL CA: CPT

## 2021-02-21 PROCEDURE — 99285 EMERGENCY DEPT VISIT HI MDM: CPT | Mod: 25

## 2021-02-21 PROCEDURE — 85018 HEMOGLOBIN: CPT

## 2021-02-21 PROCEDURE — 93005 ELECTROCARDIOGRAM TRACING: CPT

## 2021-02-21 PROCEDURE — 83036 HEMOGLOBIN GLYCOSYLATED A1C: CPT

## 2021-02-21 PROCEDURE — 36415 COLL VENOUS BLD VENIPUNCTURE: CPT

## 2021-02-21 PROCEDURE — U0005: CPT

## 2021-02-21 PROCEDURE — 93306 TTE W/DOPPLER COMPLETE: CPT

## 2021-02-21 PROCEDURE — 81001 URINALYSIS AUTO W/SCOPE: CPT

## 2021-02-21 PROCEDURE — 82435 ASSAY OF BLOOD CHLORIDE: CPT

## 2021-02-21 PROCEDURE — 80307 DRUG TEST PRSMV CHEM ANLYZR: CPT

## 2021-02-21 PROCEDURE — 85025 COMPLETE CBC W/AUTO DIFF WBC: CPT

## 2021-02-21 PROCEDURE — 84132 ASSAY OF SERUM POTASSIUM: CPT

## 2021-02-21 PROCEDURE — 82947 ASSAY GLUCOSE BLOOD QUANT: CPT

## 2021-02-21 PROCEDURE — 82009 KETONE BODYS QUAL: CPT

## 2021-02-21 PROCEDURE — 85014 HEMATOCRIT: CPT

## 2021-02-21 RX ORDER — DEXTROSE 50 % IN WATER 50 %
12.5 SYRINGE (ML) INTRAVENOUS ONCE
Refills: 0 | Status: DISCONTINUED | OUTPATIENT
Start: 2021-02-21 | End: 2021-02-21

## 2021-02-21 RX ORDER — ASPIRIN/CALCIUM CARB/MAGNESIUM 324 MG
1 TABLET ORAL
Qty: 30 | Refills: 0
Start: 2021-02-21 | End: 2021-03-22

## 2021-02-21 RX ORDER — SODIUM CHLORIDE 9 MG/ML
1000 INJECTION, SOLUTION INTRAVENOUS
Refills: 0 | Status: DISCONTINUED | OUTPATIENT
Start: 2021-02-21 | End: 2021-02-21

## 2021-02-21 RX ORDER — DEXTROSE 50 % IN WATER 50 %
25 SYRINGE (ML) INTRAVENOUS ONCE
Refills: 0 | Status: DISCONTINUED | OUTPATIENT
Start: 2021-02-21 | End: 2021-02-21

## 2021-02-21 RX ORDER — ONDANSETRON 8 MG/1
4 TABLET, FILM COATED ORAL ONCE
Refills: 0 | Status: DISCONTINUED | OUTPATIENT
Start: 2021-02-21 | End: 2021-02-21

## 2021-02-21 RX ORDER — INSULIN LISPRO 100/ML
VIAL (ML) SUBCUTANEOUS
Refills: 0 | Status: DISCONTINUED | OUTPATIENT
Start: 2021-02-21 | End: 2021-02-21

## 2021-02-21 RX ORDER — GLUCAGON INJECTION, SOLUTION 0.5 MG/.1ML
1 INJECTION, SOLUTION SUBCUTANEOUS ONCE
Refills: 0 | Status: DISCONTINUED | OUTPATIENT
Start: 2021-02-21 | End: 2021-02-21

## 2021-02-21 RX ORDER — DEXTROSE 50 % IN WATER 50 %
15 SYRINGE (ML) INTRAVENOUS ONCE
Refills: 0 | Status: DISCONTINUED | OUTPATIENT
Start: 2021-02-21 | End: 2021-02-21

## 2021-02-21 RX ADMIN — Medication 81 MILLIGRAM(S): at 12:46

## 2021-02-21 RX ADMIN — Medication 30 MILLIGRAM(S): at 05:05

## 2021-02-21 RX ADMIN — Medication 30 MILLIGRAM(S): at 12:46

## 2021-02-21 NOTE — PROGRESS NOTE ADULT - ASSESSMENT
24yo M with new onset Atrial fibrillation and dyspnea      Atrial fibrillation  spontaneous conversion to SR overnight  [id number]   TTE normal   d/c cardizem  cont asa 81mg daily  follow up with electrophysiology as out patient  no indication for ILR at this time.     Thank you for allowing me to participate in care of your patient.   Please call as needed.      
25 male with IDDM on corrective insulin at home admitted following reported altercation with police in which he was tazed with subsequent vomiting On evaluation found to be in atrial fibrillation     Atrial fibrillation  Has been in persistent afib since arrival  Continue telemetry  Continue Cardizem - appropriately ate controlled  Plan for MASSIMO Mon with possible DCCV    DM type 1 with long term insulin use with hyperglycemia  Based on A1C well controlled at home using corrective scale  Continue Lantus 10 units QHS and lispro s/s      Dispo : Home vs law enforcement custody following cardioversion if successful

## 2021-02-21 NOTE — PROGRESS NOTE ADULT - SUBJECTIVE AND OBJECTIVE BOX
[facility] Division of Hospital Medicine    Chief Complaint:  A fib     SUBJECTIVE / OVERNIGHT EVENTS:     Pt examined lying comfortably in bed  States that over the past year he has had intermittent episodes of palpitations No identifiable triggers but states that he thinks it improves when he Vapes  Patient denies chest pain, SOB, abd pain, N/V, fever, chills, dysuria or any other complaints. All remainder ROS negative.     MEDICATIONS  (STANDING):  aspirin  chewable 81 milliGRAM(s) Oral daily  dextrose 40% Gel 15 Gram(s) Oral once  dextrose 5%. 1000 milliLiter(s) (50 mL/Hr) IV Continuous <Continuous>  dextrose 5%. 1000 milliLiter(s) (100 mL/Hr) IV Continuous <Continuous>  dextrose 50% Injectable 25 Gram(s) IV Push once  dextrose 50% Injectable 12.5 Gram(s) IV Push once  dextrose 50% Injectable 25 Gram(s) IV Push once  diltiazem    Tablet 30 milliGRAM(s) Oral every 6 hours  glucagon  Injectable 1 milliGRAM(s) IntraMuscular once  insulin glargine Injectable (LANTUS) 10 Unit(s) SubCutaneous at bedtime  insulin lispro (ADMELOG) corrective regimen sliding scale   SubCutaneous three times a day before meals  insulin lispro (ADMELOG) corrective regimen sliding scale   SubCutaneous at bedtime  sodium chloride 0.9%. 1000 milliLiter(s) (125 mL/Hr) IV Continuous <Continuous>    MEDICATIONS  (PRN):  diltiazem Injectable 5 milliGRAM(s) IV Push every 6 hours PRN for HR above 120        I&O's Summary      PHYSICAL EXAM:  Vital Signs Last 24 Hrs  T(C): 36.7 (2021 16:00), Max: 37.1 (2021 07:53)  T(F): 98.1 (2021 16:00), Max: 98.8 (2021 07:53)  HR: 72 (2021 16:00) (72 - 118)  BP: 113/74 (2021 16:00) (92/46 - 121/65)  BP(mean): 61 (2021 04:29) (61 - 87)  RR: 18 (2021 16:00) (16 - 18)  SpO2: 98% (2021 16:00) (97% - 100%)        CONSTITUTIONAL: NAD, well-developed, well-groomed, handcuffed to bedrail   ENMT: Moist oral mucosa, no pharyngeal injection or exudates; normal dentition  RESPIRATORY: Normal respiratory effort; lungs are clear to auscultation bilaterally  CARDIOVASCULAR: Irregularly irregular,  murmur/rub/gallop; No lower extremity edema; Peripheral pulses are 2+ bilaterally  ABDOMEN: Nontender to palpation, normoactive bowel sounds, no rebound/guarding; No hepatosplenomegaly  MUSCLOSKELETAL:  no clubbing or cyanosis of digits; no joint swelling or tenderness to palpation  PSYCH: A+O to person, place, and time; affect appropriate  NEUROLOGY: CN 2-12 are intact and symmetric; no gross sensory deficits;   SKIN: No rashes; no palpable lesions    LABS:                        12.8   10.32 )-----------( 161      ( 2021 02:21 )             36.5     02-    139  |  105  |  5.0<L>  ----------------------------<  84  4.0   |  23.0  |  1.10    Ca    8.3<L>      2021 19:25    TPro  6.7  /  Alb  4.2  /  TBili  0.2<L>  /  DBili  x   /  AST  25  /  ALT  16  /  AlkPhos  61  02-      CARDIAC MARKERS ( 2021 11:15 )  x     / <0.01 ng/mL / x     / x     / x      CARDIAC MARKERS ( 2021 02:21 )  x     / <0.01 ng/mL / x     / x     / x      CARDIAC MARKERS ( 2021 17:24 )  x     / <0.01 ng/mL / x     / x     / x          Urinalysis Basic - ( 2021 23:26 )    Color: Yellow / Appearance: Clear / S.005 / pH: x  Gluc: x / Ketone: Negative  / Bili: Negative / Urobili: Negative mg/dL   Blood: x / Protein: Negative mg/dL / Nitrite: Negative   Leuk Esterase: Negative / RBC: x / WBC x   Sq Epi: x / Non Sq Epi: x / Bacteria: x        CAPILLARY BLOOD GLUCOSE      POCT Blood Glucose.: 80 mg/dL (2021 16:40)  POCT Blood Glucose.: 74 mg/dL (2021 11:58)  POCT Blood Glucose.: 83 mg/dL (2021 08:04)  POCT Blood Glucose.: 87 mg/dL (2021 00:06)  POCT Blood Glucose.: 218 mg/dL (2021 16:59)        RADIOLOGY & ADDITIONAL TESTS:  Results Reviewed:   Imaging Personally Reviewed:  Electrocardiogram Personally Reviewed:                                          
                                                               [facility]                                                               Office: [address] [address]                                                              Telephone: [phone]. Fax:[phone]                                                                             PROGRESS NOTE  Reason for follow up: Afib  Overnight: No new events.   Update: coverted to SR approx 2315 last night. States feel "amazing". denies chest pain, palpitations, racing heart, dyspnea, racing heart.       Review of symptoms:   Cardiac:  No chest pain. No dyspnea. No palpitations.  Respiratory:no cough. No dyspnea  Gastrointestinal: No diarrhea. No abdominal pain. No bleeding.   Neuro: No focal neuro complaints.      Vitals:  T(C): 36.8 (02-21-21 @ 07:59), Max: 36.8 (02-21-21 @ 07:59)  HR: 68 (02-21-21 @ 07:59) (68 - 89)  BP: 101/67 (02-21-21 @ 07:59) (101/67 - 122/72)  RR: 18 (02-21-21 @ 07:59) (18 - 18)  SpO2: 96% (02-21-21 @ 07:59) (96% - 98%)      Weight (kg): 68.039 (02-19 @ 23:45)      PHYSICAL EXAM:  Appearance: Comfortable. No acute distress  HEENT:  Atraumatic. Normocephalic.  Normal oral mucosa, PERRL, Neck is supple. No carotid bruit.   Neurologic: A & O x 3, no focal deficits. EOMI.  Cardiovascular: Normal S1 S2, No murmur, rubs/gallops. No JVD, No edema  Respiratory: Lungs clear to auscultation, unlabored   Gastrointestinal:  Soft, Non-tender, + BS  Lower Extremities: No edema  Psychiatry: Patient is calm. No agitation. Mood & affect appropriate  Skin: No rashes/ ecchymoses/cyanosis/ulcers visualized on the face, hands or feet.      CURRENT MEDICATIONS:  diltiazem    Tablet 30 milliGRAM(s) Oral every 6 hours  diltiazem Injectable 5 milliGRAM(s) IV Push every 6 hours PRN    ondansetron    Tablet  dextrose 40% Gel  dextrose 50% Injectable  glucagon  Injectable  insulin lispro (ADMELOG) corrective regimen sliding scale  aspirin  chewable  dextrose 5%.  sodium chloride 0.9%.      DIAGNOSTIC TESTING:  [ ] Echocardiogram:   < from: TTE Echo Complete w/o Contrast w/ Doppler (02.20.21 @ 10:04) >  Summary:   1. Left ventricular ejection fraction, by visual estimation, is 55 to 60%.   2. Normal global left ventricular systolic function.   3. Normal left atrial size.   4. Normal right atrial size.   5. Mild Mitral valve prolapse.   6. Mild thickening of the anterior and posterior mitral valve leaflets.   7. Trace mitral valve regurgitation.   8. Mild tricuspid regurgitation.    MD Huber Electronically signed on 2/20/2021 at 12:58:13 PM    < end of copied text >    [ ]  Catheterization:  [ ] Stress Test:    OTHER: 	      LABS:	 	  CARDIAC MARKERS ( 20 Feb 2021 11:15 )  x     / <0.01 ng/mL / x     / x     / x      p-BNP 20 Feb 2021 11:15: x    , CARDIAC MARKERS ( 20 Feb 2021 02:21 )  x     / <0.01 ng/mL / x     / x     / x      p-BNP 20 Feb 2021 02:21: x    , CARDIAC MARKERS ( 19 Feb 2021 17:24 )  x     / <0.01 ng/mL / x     / x     / x      p-BNP 19 Feb 2021 17:24: x                              14.5   6.50  )-----------( 172      ( 21 Feb 2021 07:41 )             41.9     02-21    139  |  102  |  6.0<L>  ----------------------------<  65<L>  3.9   |  24.0  |  0.85    Ca    9.0      21 Feb 2021 07:41    TPro  6.7  /  Alb  4.2  /  TBili  0.2<L>  /  DBili  x   /  AST  25  /  ALT  16  /  AlkPhos  61  02-19    TSH: Thyroid Stimulating Hormone, Serum: 1.44 uIU/mL    TELEMETRY: Afib RVR, Afib, converted to SR approx 2315 last night

## 2021-02-21 NOTE — DISCHARGE NOTE NURSING/CASE MANAGEMENT/SOCIAL WORK - PATIENT PORTAL LINK FT
You can access the FollowMyHealth Patient Portal offered by [facility] by registering at the following website: [email or web address]. By joining [employer]’s FollowMyHealth portal, you will also be able to view your health information using other applications (apps) compatible with our system. faxed

## 2021-02-21 NOTE — PROGRESS NOTE ADULT - ATTENDING COMMENTS
24 y/o M with newly diagnosed AF, converted spontaneously to sinus rhythm overnight  Feels well  TTE grossly normal  No further inpatient cardiac workup needed at this time  Patient to follow up with EP as outpatient  Continue ASA on d/c    Thank you for allowing me to participate in the care of this patient  Please contact me with any further questions.

## 2021-02-21 NOTE — DISCHARGE NOTE PROVIDER - NSDCCPCAREPLAN_GEN_ALL_CORE_FT
PRINCIPAL DISCHARGE DIAGNOSIS  Diagnosis: Atrial fibrillation, unspecified type  Assessment and Plan of Treatment: Continue ASA 81 mg daily/ follow up with cardiology
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
## 2021-02-21 NOTE — DISCHARGE NOTE PROVIDER - CARE PROVIDER_API CALL
Zach Salomon)  Cardiology; Internal Medicine  [address] [place] [address], [place] [address]  Phone: [phone]  Fax: [phone]  Follow Up Time: 2 weeks
